# Patient Record
Sex: FEMALE | Race: WHITE
[De-identification: names, ages, dates, MRNs, and addresses within clinical notes are randomized per-mention and may not be internally consistent; named-entity substitution may affect disease eponyms.]

---

## 2023-12-22 ENCOUNTER — NON-APPOINTMENT (OUTPATIENT)
Age: 68
End: 2023-12-22

## 2023-12-22 PROBLEM — Z00.00 ENCOUNTER FOR PREVENTIVE HEALTH EXAMINATION: Status: ACTIVE | Noted: 2023-12-22

## 2023-12-22 NOTE — REASON FOR VISIT
[Initial Eval - Existing Diagnosis] : an initial evaluation of an existing diagnosis [FreeTextEntry1] : The patient is a postmenopausal white female of Ashkenazi [employer] descent with a strong family history of breast, ovarian, and pancreatic cancer.  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  She did undergo prophylactic bilateral salpingo-oophorectomies in November 2010.  She was lost to follow-up in 2016 and comes in now to reestablish care.

## 2023-12-22 NOTE — ASSESSMENT
[FreeTextEntry1] : The patient is a 68-year-old G5, P4 postmenopausal white female of Ashkenazi Syn[employer] descent.  She underwent menopause at age 49 and never took any hormone replacement therapy.  She underwent menarche at age 11 and had her first child at age 24.  She did undergo a prophylactic bilateral salpingo-oophorectomy in 2010.  She had a history of 3 prior breast biopsies in the past which were benign.  She has a strong family history of cancer with her mother who had thyroid cancer at age 50, then breast cancer at age 53, then lung cancer at age 77, and then stage III ovarian cancer at age 85 and her mother tested BRCA2 positive.  The mother then developed a melanoma over her right breast.  The patient's sister had thyroid cancer at age 52 and tested BRCA negative.  The patient has a maternal first cousin who had breast cancer in her 40s who had a mastectomy and then later recurrence who tested BRCA2 positive.  She has a maternal uncle who  of pancreatic cancer in his 70s and tested BRCA2 positive.  She has a paternal uncle who had multiple myeloma in his 50s.  Another paternal uncle had bladder cancer in his 70s.  The patient's brother  of prostate cancer at age 59.  The patient herself tested positive for an [employer] 6174 deletion T BRCA2 mutation in 2009.  She underwent bilateral prophylactic salpingo-oophorectomies in 2010.  She then underwent bilateral prophylactic nipple sparing mastectomies with direct to implant reconstructions performed by myself and Dr. Werner on October 15, 2015 and the final pathology was benign.  She did have a bilateral breast MRI performed back on 2019 ordered by another physician which showed no suspicious findings.  On exam today, I cannot feel any suspicious findings over either implant.  She has some chronic scarring changes underneath the right implant ???????? which were noted on her last visit back in  and are not clinically concerning.  The patient was reassured but was advised to continue at least routine yearly clinical exams for breast cancer screening/surveillance.  I would like to see her again in 1 year in 2025.  She does not require any diagnostic breast imaging studies at this time ???????.

## 2023-12-22 NOTE — PAST MEDICAL HISTORY
[Postmenopausal] : The patient is postmenopausal [Menarche Age ____] : age at menarche was [unfilled] [Menopause Age____] : age at menopause was [unfilled] [History of Hormone Replacement Treatment] : has no history of hormone replacement treatment [Total Preg ___] : G[unfilled] [Live Births ___] : P[unfilled]  [Age At Live Birth ___] : Age at live birth: [unfilled] [de-identified] : s/p prophylactic BSO in November 2010

## 2023-12-22 NOTE — PHYSICAL EXAM
[Normocephalic] : normocephalic [Atraumatic] : atraumatic [EOMI] : extra ocular movement intact [Supple] : supple [No Supraclavicular Adenopathy] : no supraclavicular adenopathy [No Cervical Adenopathy] : no cervical adenopathy [Examined in the supine and seated position] : examined in the supine and seated position [No dominant masses] : no dominant masses in right breast  [No dominant masses] : no dominant masses left breast [No Nipple Retraction] : no left nipple retraction [No Nipple Discharge] : no left nipple discharge [Breast Mass Right Breast ___cm] : no masses [Breast Mass Left Breast ___cm] : no masses [Breast Nipple Inversion] : nipples not inverted [Breast Nipple Retraction] : nipples not retracted [Breast Nipple Flattening] : nipples not flattened [Breast Nipple Fissures] : nipples not fissured [Breast Abnormal Lactation (Galactorrhea)] : no galactorrhea [Breast Abnormal Secretion Bloody Fluid] : no bloody discharge [Breast Abnormal Secretion Serous Fluid] : no serous discharge [Breast Abnormal Secretion Opalescent Fluid] : no milky discharge [No Axillary Lymphadenopathy] : no left axillary lymphadenopathy [No Edema] : no edema [No Rashes] : no rashes [No Ulceration] : no ulceration [de-identified] : On exam, the patient has bilateral nipple sparing mastectomies with direct to implant reconstructions.  On palpation, I cannot feel any suspicious densities over either implant.  She has no axillary, supraclavicular, or cervical adenopathy.  She has had some scarring changes underneath the right implant which are chronic. [de-identified] : Status post prophylactic nipple sparing mastectomy with direct to implant reconstruction with no suspicious findings. [de-identified] : Status post prophylactic nipple sparing mastectomy with direct to implant reconstruction with no suspicious findings.

## 2023-12-22 NOTE — HISTORY OF PRESENT ILLNESS
[FreeTextEntry1] : The patient is a 68-year-old G5, P4 postmenopausal white female of Ashkenazi Anabaptism descent.  She underwent menopause at age 49 and never took any hormone replacement therapy.  She underwent menarche at age 11 and had her first child at age 24.  She did undergo a prophylactic bilateral salpingo-oophorectomy in 2010.  She had a history of 3 prior breast biopsies in the past which were benign.  She has a strong family history of cancer with her mother who had thyroid cancer at age 50, then breast cancer at age 53, then lung cancer at age 77, and then stage III ovarian cancer at age 85 and her mother tested BRCA2 positive.  The mother then developed a melanoma over her right breast.  The patient's sister had thyroid cancer at age 52 and tested BRCA negative.  The patient has a maternal first cousin who had breast cancer in her 40s who had a mastectomy and then later recurrence who tested BRCA2 positive.  She has a maternal uncle who  of pancreatic cancer in his 70s and tested BRCA2 positive.  She has a paternal uncle who had multiple myeloma in his 50s.  Another paternal uncle had bladder cancer in his 70s.  The patient's brother  of prostate cancer at age 59.  The patient herself tested positive for an Ashkenazi An[employer]ism 6174 deletion T BRCA2 mutation in 2009.  She underwent bilateral prophylactic salpingo-oophorectomies in 2010.  She then underwent bilateral prophylactic nipple sparing mastectomies with direct to implant reconstructions performed by myself and Dr. Werner on October 15, 2015 and the final pathology was benign.  She did have a bilateral breast MRI performed back on 2019 ordered by another physician which showed no suspicious findings.  She was lost to follow-up since  and comes in now to reestablish care.

## 2024-01-02 DIAGNOSIS — Z80.7 FAMILY HISTORY OF OTHER MALIGNANT NEOPLASMS OF LYMPHOID, HEMATOPOIETIC AND RELATED TISSUES: ICD-10-CM

## 2024-01-02 DIAGNOSIS — Z80.1 FAMILY HISTORY OF MALIGNANT NEOPLASM OF TRACHEA, BRONCHUS AND LUNG: ICD-10-CM

## 2024-01-03 ENCOUNTER — APPOINTMENT (OUTPATIENT)
Dept: BREAST CENTER | Facility: CLINIC | Age: 69
End: 2024-01-03
Payer: MEDICARE

## 2024-01-03 VITALS
WEIGHT: 120 LBS | BODY MASS INDEX: 20.49 KG/M2 | HEART RATE: 57 BPM | HEIGHT: 64 IN | TEMPERATURE: 97.7 F | OXYGEN SATURATION: 98 %

## 2024-01-03 VITALS
HEART RATE: 57 BPM | OXYGEN SATURATION: 98 % | HEIGHT: 64 IN | DIASTOLIC BLOOD PRESSURE: 78 MMHG | SYSTOLIC BLOOD PRESSURE: 115 MMHG | BODY MASS INDEX: 20.49 KG/M2 | WEIGHT: 120 LBS

## 2024-01-03 DIAGNOSIS — Z90.13 ACQUIRED ABSENCE OF BILATERAL BREASTS AND NIPPLES: ICD-10-CM

## 2024-01-03 DIAGNOSIS — Z87.891 PERSONAL HISTORY OF NICOTINE DEPENDENCE: ICD-10-CM

## 2024-01-03 DIAGNOSIS — Z80.3 FAMILY HISTORY OF MALIGNANT NEOPLASM OF BREAST: ICD-10-CM

## 2024-01-03 DIAGNOSIS — Z80.8 FAMILY HISTORY OF MALIGNANT NEOPLASM OF OTHER ORGANS OR SYSTEMS: ICD-10-CM

## 2024-01-03 DIAGNOSIS — Z80.42 FAMILY HISTORY OF MALIGNANT NEOPLASM OF PROSTATE: ICD-10-CM

## 2024-01-03 DIAGNOSIS — Z98.890 OTHER SPECIFIED POSTPROCEDURAL STATES: ICD-10-CM

## 2024-01-03 DIAGNOSIS — E04.2 NONTOXIC MULTINODULAR GOITER: ICD-10-CM

## 2024-01-03 DIAGNOSIS — Z15.09 GENETIC SUSCEPTIBILITY TO MALIGNANT NEOPLASM OF BREAST: ICD-10-CM

## 2024-01-03 DIAGNOSIS — Z80.52 FAMILY HISTORY OF MALIGNANT NEOPLASM OF BLADDER: ICD-10-CM

## 2024-01-03 DIAGNOSIS — Z78.9 OTHER SPECIFIED HEALTH STATUS: ICD-10-CM

## 2024-01-03 DIAGNOSIS — Z80.41 FAMILY HISTORY OF MALIGNANT NEOPLASM OF OVARY: ICD-10-CM

## 2024-01-03 DIAGNOSIS — H47.099 OTHER DISORDERS OF OPTIC NERVE, NOT ELSEWHERE CLASSIFIED, UNSPECIFIED EYE: ICD-10-CM

## 2024-01-03 DIAGNOSIS — Z15.01 GENETIC SUSCEPTIBILITY TO MALIGNANT NEOPLASM OF BREAST: ICD-10-CM

## 2024-01-03 DIAGNOSIS — Z80.0 FAMILY HISTORY OF MALIGNANT NEOPLASM OF DIGESTIVE ORGANS: ICD-10-CM

## 2024-01-03 PROCEDURE — 99203 OFFICE O/P NEW LOW 30 MIN: CPT

## 2024-01-03 RX ORDER — DORZOLAMIDE HYDROCHLORIDE AND TIMOLOL MALEATE 20; 5 MG/ML; MG/ML
SOLUTION/ DROPS OPHTHALMIC
Refills: 0 | Status: ACTIVE | COMMUNITY

## 2024-01-03 NOTE — REASON FOR VISIT
[Initial Eval - Existing Diagnosis] : an initial evaluation of an existing diagnosis [FreeTextEntry1] : The patient is a postmenopausal female of Ashkenazi Am[employer] descent with a strong family history of breast and ovarian cancer.  She tested BRCA2 positive with an Ashkenazi Amish  mutation in September 2009 and underwent bilateral prophylactic salpingo-oophorectomies in November 2010.  She underwent bilateral prophylactic nipple sparing mastectomies with direct to implant reconstructions in October 2015 and final pathology was benign.  She comes in now for routine follow-up.

## 2024-01-03 NOTE — ADDENDUM
[FreeTextEntry1] : I spent greater than 75% of consultation in face-to-face counseling and coronation care in this BRCA2 positive patient with a history of undergoing bilateral prophylactic nipple sparing mastectomies back in 2015.

## 2024-01-03 NOTE — HISTORY OF PRESENT ILLNESS
[FreeTextEntry1] : The patient is a 68-year-old  postmenopausal white female of Ashkenazi Baptism descent.  She underwent menopause at age 49 and never took any hormone replacement therapy.  She underwent menarche at age 11 and had her first child at age 24.  She has a history of 3 breast biopsies in the past which were all benign.  She has a strong family history of cancer with her mother who had thyroid cancer at age 50, breast cancer at age 53, lung cancer at age 77, and stage III ovarian cancer at age 85 and tested BRCA2 positive.  The mother then developed a melanoma over her right breast and eventually passed away from metastatic ovarian cancer.  The patient's sister had thyroid cancer at age 52 and tested BRCA negative.  The patient has a maternal first cousin who had breast cancer in her 40s and had a mastectomy and then had a recurrence and tested BRCA2 positive.  She has a maternal uncle who  of pancreatic cancer in his 70s and tested BRCA2 positive.  She has a paternal uncle who had multiple myeloma in his 50s.  She has a paternal uncle who had bladder cancer in his 70s.  The patient's brother  of prostate cancer at age 59.  The patient herself tested positive for the same Ashkenazi [employer] BRCA2 mutation in 2009 with a 6174 deletion T mutation.  She underwent prophylactic bilateral salpingo-oophorectomies in 2010.  She underwent bilateral prophylactic nipple sparing mastectomies with direct to implant reconstructions on October 15, 2015 and the final pathology was benign.  She comes in now to reestablish routine follow-up.

## 2024-01-03 NOTE — PHYSICAL EXAM
[Normocephalic] : normocephalic [Atraumatic] : atraumatic [EOMI] : extra ocular movement intact [Supple] : supple [No Supraclavicular Adenopathy] : no supraclavicular adenopathy [No Cervical Adenopathy] : no cervical adenopathy [Examined in the supine and seated position] : examined in the supine and seated position [No dominant masses] : no dominant masses in right breast  [No dominant masses] : no dominant masses left breast [No Nipple Retraction] : no left nipple retraction [No Nipple Discharge] : no left nipple discharge [Breast Mass Right Breast ___cm] : no masses [Breast Mass Left Breast ___cm] : no masses [No Axillary Lymphadenopathy] : no left axillary lymphadenopathy [No Edema] : no edema [No Rashes] : no rashes [No Ulceration] : no ulceration [Breast Nipple Inversion] : nipples not inverted [Breast Nipple Retraction] : nipples not retracted [Breast Nipple Flattening] : nipples not flattened [Breast Nipple Fissures] : nipples not fissured [Breast Abnormal Lactation (Galactorrhea)] : no galactorrhea [Breast Abnormal Secretion Bloody Fluid] : no bloody discharge [Breast Abnormal Secretion Serous Fluid] : no serous discharge [Breast Abnormal Secretion Opalescent Fluid] : no milky discharge [de-identified] : On exam, the patient has obvious bilateral nipple sparing mastectomies with direct to implant reconstructions.  On palpation, I cannot feel any suspicious densities over either implant.  She has no axillary, supraclavicular, or cervical adenopathy.  She has some typical scarring changes underneath the right implant which are unchanged.  She has excellent sensation on the superior, medial, and lateral poles.  She has some decrease sensation on the inferior left breast and no nipple areolar sensation. [de-identified] : Status post prophylactic nipple sparing mastectomy and direct to implant reconstruction with no suspicious findings. [de-identified] : Status post prophylactic nipple sparing mastectomy and direct to implant reconstruction with no suspicious findings.

## 2024-01-03 NOTE — PAST MEDICAL HISTORY
[Postmenopausal] : The patient is postmenopausal [Menarche Age ____] : age at menarche was [unfilled] [Menopause Age____] : age at menopause was [unfilled] [Total Preg ___] : G[unfilled] [Live Births ___] : P[unfilled]  [Age At Live Birth ___] : Age at live birth: [unfilled] [History of Hormone Replacement Treatment] : has no history of hormone replacement treatment [de-identified] : s/p prophylactic BSO in 11/2010

## 2024-01-03 NOTE — ASSESSMENT
[FreeTextEntry1] : The patient is a 68-year-old  postmenopausal white female of Ashkenazi [employer] descent.  She underwent menopause at age 49 and never took any hormone replacement therapy.  She underwent menarche at age 11 and had her first child at age 24.  She has a history of 3 breast biopsies in the past which were all benign.  She has a strong family history of cancer with her mother who had thyroid cancer at age 50, breast cancer at age 53, lung cancer at age 77, and stage III ovarian cancer at age 85 and tested BRCA2 positive.  The mother then developed a melanoma over her right breast and eventually passed away from metastatic ovarian cancer.  The patient's sister had thyroid cancer at age 52 and tested BRCA negative.  The patient has a maternal first cousin who had breast cancer in her 40s and had a mastectomy and then had a recurrence and tested BRCA2 positive.  She has a maternal uncle who  of pancreatic cancer in his 70s and tested BRCA2 positive.  She has a paternal uncle who had multiple myeloma in his 50s.  She has a paternal uncle who had bladder cancer in his 70s.  The patient's brother  of prostate cancer at age 59.  The patient herself tested positive for the same Ashkenazi [employer] BRCA2 mutation in 2009 with a 6174 deletion T mutation.  She underwent prophylactic bilateral salpingo-oophorectomies in 2010.  She underwent bilateral prophylactic nipple sparing mastectomies with direct to implant reconstructions on October 15, 2015 and the final pathology was benign.  She did undergo a bilateral breast MRI on 2019 which showed no suspicious findings.  On exam today, I cannot feel any suspicious findings over either implant.  She has not been seen since  and was advised to continue to come in for routine yearly clinical breast exams.  I would like to see her again in 2025.  I did speak to the patient about pancreatic cancer screening and encouraged her to look for pancreatic screening programs.  She could also speak to her gastroenterologist about possible screening test.